# Patient Record
Sex: FEMALE
[De-identification: names, ages, dates, MRNs, and addresses within clinical notes are randomized per-mention and may not be internally consistent; named-entity substitution may affect disease eponyms.]

---

## 2021-03-01 ENCOUNTER — APPOINTMENT (OUTPATIENT)
Dept: ANTEPARTUM | Facility: CLINIC | Age: 36
End: 2021-03-01
Payer: SELF-PAY

## 2021-03-01 ENCOUNTER — ASOB RESULT (OUTPATIENT)
Age: 36
End: 2021-03-01

## 2021-03-01 PROBLEM — Z00.00 ENCOUNTER FOR PREVENTIVE HEALTH EXAMINATION: Status: ACTIVE | Noted: 2021-03-01

## 2021-03-01 PROCEDURE — 76811 OB US DETAILED SNGL FETUS: CPT

## 2021-07-09 ENCOUNTER — INPATIENT (INPATIENT)
Facility: HOSPITAL | Age: 36
LOS: 1 days | Discharge: HOME | End: 2021-07-11
Attending: OBSTETRICS & GYNECOLOGY | Admitting: OBSTETRICS & GYNECOLOGY

## 2021-07-09 ENCOUNTER — EMERGENCY (EMERGENCY)
Facility: HOSPITAL | Age: 36
LOS: 1 days | Discharge: STILL ADMITTED OR ADMINISTRATIVE | End: 2021-07-09
Admitting: EMERGENCY MEDICINE
Payer: SELF-PAY

## 2021-07-09 VITALS
RESPIRATION RATE: 18 BRPM | TEMPERATURE: 98 F | HEART RATE: 97 BPM | DIASTOLIC BLOOD PRESSURE: 53 MMHG | SYSTOLIC BLOOD PRESSURE: 114 MMHG | OXYGEN SATURATION: 100 %

## 2021-07-09 VITALS
DIASTOLIC BLOOD PRESSURE: 72 MMHG | TEMPERATURE: 98 F | SYSTOLIC BLOOD PRESSURE: 111 MMHG | HEART RATE: 91 BPM | RESPIRATION RATE: 16 BRPM

## 2021-07-09 DIAGNOSIS — O42.10 PREMATURE RUPTURE OF MEMBRANES, ONSET OF LABOR MORE THAN 24 HOURS FOLLOWING RUPTURE, UNSPECIFIED WEEKS OF GESTATION: ICD-10-CM

## 2021-07-09 DIAGNOSIS — Z98.890 OTHER SPECIFIED POSTPROCEDURAL STATES: Chronic | ICD-10-CM

## 2021-07-09 DIAGNOSIS — Z3A.00 WEEKS OF GESTATION OF PREGNANCY NOT SPECIFIED: ICD-10-CM

## 2021-07-09 DIAGNOSIS — Z33.2 ENCOUNTER FOR ELECTIVE TERMINATION OF PREGNANCY: Chronic | ICD-10-CM

## 2021-07-09 DIAGNOSIS — O26.899 OTHER SPECIFIED PREGNANCY RELATED CONDITIONS, UNSPECIFIED TRIMESTER: ICD-10-CM

## 2021-07-09 LAB
BASOPHILS # BLD AUTO: 0.02 K/UL — SIGNIFICANT CHANGE UP (ref 0–0.2)
BASOPHILS NFR BLD AUTO: 0.2 % — SIGNIFICANT CHANGE UP (ref 0–2)
BLD GP AB SCN SERPL QL: NEGATIVE — SIGNIFICANT CHANGE UP
COVID-19 SPIKE DOMAIN AB INTERP: NEGATIVE — SIGNIFICANT CHANGE UP
COVID-19 SPIKE DOMAIN ANTIBODY RESULT: 0.4 U/ML — SIGNIFICANT CHANGE UP
EOSINOPHIL # BLD AUTO: 0.16 K/UL — SIGNIFICANT CHANGE UP (ref 0–0.5)
EOSINOPHIL NFR BLD AUTO: 2 % — SIGNIFICANT CHANGE UP (ref 0–6)
HCT VFR BLD CALC: 33.3 % — LOW (ref 34.5–45)
HGB BLD-MCNC: 11 G/DL — LOW (ref 11.5–15.5)
IANC: 5.25 K/UL — SIGNIFICANT CHANGE UP (ref 1.5–8.5)
IMM GRANULOCYTES NFR BLD AUTO: 1.7 % — HIGH (ref 0–1.5)
LYMPHOCYTES # BLD AUTO: 1.73 K/UL — SIGNIFICANT CHANGE UP (ref 1–3.3)
LYMPHOCYTES # BLD AUTO: 21.5 % — SIGNIFICANT CHANGE UP (ref 13–44)
MCHC RBC-ENTMCNC: 28.7 PG — SIGNIFICANT CHANGE UP (ref 27–34)
MCHC RBC-ENTMCNC: 33 GM/DL — SIGNIFICANT CHANGE UP (ref 32–36)
MCV RBC AUTO: 86.9 FL — SIGNIFICANT CHANGE UP (ref 80–100)
MONOCYTES # BLD AUTO: 0.76 K/UL — SIGNIFICANT CHANGE UP (ref 0–0.9)
MONOCYTES NFR BLD AUTO: 9.4 % — SIGNIFICANT CHANGE UP (ref 2–14)
NEUTROPHILS # BLD AUTO: 5.25 K/UL — SIGNIFICANT CHANGE UP (ref 1.8–7.4)
NEUTROPHILS NFR BLD AUTO: 65.2 % — SIGNIFICANT CHANGE UP (ref 43–77)
NRBC # BLD: 0 /100 WBCS — SIGNIFICANT CHANGE UP
NRBC # FLD: 0 K/UL — SIGNIFICANT CHANGE UP
PLATELET # BLD AUTO: 294 K/UL — SIGNIFICANT CHANGE UP (ref 150–400)
RBC # BLD: 3.83 M/UL — SIGNIFICANT CHANGE UP (ref 3.8–5.2)
RBC # FLD: 14 % — SIGNIFICANT CHANGE UP (ref 10.3–14.5)
RH IG SCN BLD-IMP: POSITIVE — SIGNIFICANT CHANGE UP
RH IG SCN BLD-IMP: POSITIVE — SIGNIFICANT CHANGE UP
SARS-COV-2 IGG+IGM SERPL QL IA: 0.4 U/ML — SIGNIFICANT CHANGE UP
SARS-COV-2 IGG+IGM SERPL QL IA: NEGATIVE — SIGNIFICANT CHANGE UP
SARS-COV-2 RNA SPEC QL NAA+PROBE: SIGNIFICANT CHANGE UP
T PALLIDUM AB TITR SER: NEGATIVE — SIGNIFICANT CHANGE UP
WBC # BLD: 8.06 K/UL — SIGNIFICANT CHANGE UP (ref 3.8–10.5)
WBC # FLD AUTO: 8.06 K/UL — SIGNIFICANT CHANGE UP (ref 3.8–10.5)

## 2021-07-09 PROCEDURE — L9993: CPT

## 2021-07-09 RX ORDER — AER TRAVELER 0.5 G/1
1 SOLUTION RECTAL; TOPICAL EVERY 4 HOURS
Refills: 0 | Status: DISCONTINUED | OUTPATIENT
Start: 2021-07-09 | End: 2021-07-11

## 2021-07-09 RX ORDER — SODIUM CHLORIDE 9 MG/ML
3 INJECTION INTRAMUSCULAR; INTRAVENOUS; SUBCUTANEOUS EVERY 8 HOURS
Refills: 0 | Status: DISCONTINUED | OUTPATIENT
Start: 2021-07-09 | End: 2021-07-11

## 2021-07-09 RX ORDER — OXYTOCIN 10 UNIT/ML
2 VIAL (ML) INJECTION
Qty: 30 | Refills: 0 | Status: DISCONTINUED | OUTPATIENT
Start: 2021-07-09 | End: 2021-07-11

## 2021-07-09 RX ORDER — AMPICILLIN TRIHYDRATE 250 MG
1 CAPSULE ORAL EVERY 4 HOURS
Refills: 0 | Status: DISCONTINUED | OUTPATIENT
Start: 2021-07-09 | End: 2021-07-09

## 2021-07-09 RX ORDER — OXYTOCIN 10 UNIT/ML
333.33 VIAL (ML) INJECTION
Qty: 20 | Refills: 0 | Status: DISCONTINUED | OUTPATIENT
Start: 2021-07-09 | End: 2021-07-11

## 2021-07-09 RX ORDER — OXYCODONE HYDROCHLORIDE 5 MG/1
5 TABLET ORAL ONCE
Refills: 0 | Status: DISCONTINUED | OUTPATIENT
Start: 2021-07-09 | End: 2021-07-11

## 2021-07-09 RX ORDER — LANOLIN
1 OINTMENT (GRAM) TOPICAL EVERY 6 HOURS
Refills: 0 | Status: DISCONTINUED | OUTPATIENT
Start: 2021-07-09 | End: 2021-07-11

## 2021-07-09 RX ORDER — HYDROCORTISONE 1 %
1 OINTMENT (GRAM) TOPICAL EVERY 6 HOURS
Refills: 0 | Status: DISCONTINUED | OUTPATIENT
Start: 2021-07-09 | End: 2021-07-11

## 2021-07-09 RX ORDER — OXYCODONE HYDROCHLORIDE 5 MG/1
5 TABLET ORAL
Refills: 0 | Status: DISCONTINUED | OUTPATIENT
Start: 2021-07-09 | End: 2021-07-11

## 2021-07-09 RX ORDER — IBUPROFEN 200 MG
600 TABLET ORAL EVERY 6 HOURS
Refills: 0 | Status: COMPLETED | OUTPATIENT
Start: 2021-07-09 | End: 2022-06-07

## 2021-07-09 RX ORDER — DIBUCAINE 1 %
1 OINTMENT (GRAM) RECTAL EVERY 6 HOURS
Refills: 0 | Status: DISCONTINUED | OUTPATIENT
Start: 2021-07-09 | End: 2021-07-11

## 2021-07-09 RX ORDER — OXYTOCIN 10 UNIT/ML
333.33 VIAL (ML) INJECTION
Qty: 20 | Refills: 0 | Status: COMPLETED | OUTPATIENT
Start: 2021-07-09 | End: 2021-07-09

## 2021-07-09 RX ORDER — BENZOCAINE 10 %
1 GEL (GRAM) MUCOUS MEMBRANE EVERY 6 HOURS
Refills: 0 | Status: DISCONTINUED | OUTPATIENT
Start: 2021-07-09 | End: 2021-07-11

## 2021-07-09 RX ORDER — DIPHENHYDRAMINE HCL 50 MG
25 CAPSULE ORAL EVERY 6 HOURS
Refills: 0 | Status: DISCONTINUED | OUTPATIENT
Start: 2021-07-09 | End: 2021-07-11

## 2021-07-09 RX ORDER — DIPHENHYDRAMINE HCL 50 MG
25 CAPSULE ORAL EVERY 4 HOURS
Refills: 0 | Status: DISCONTINUED | OUTPATIENT
Start: 2021-07-09 | End: 2021-07-11

## 2021-07-09 RX ORDER — TETANUS TOXOID, REDUCED DIPHTHERIA TOXOID AND ACELLULAR PERTUSSIS VACCINE, ADSORBED 5; 2.5; 8; 8; 2.5 [IU]/.5ML; [IU]/.5ML; UG/.5ML; UG/.5ML; UG/.5ML
0.5 SUSPENSION INTRAMUSCULAR ONCE
Refills: 0 | Status: DISCONTINUED | OUTPATIENT
Start: 2021-07-09 | End: 2021-07-11

## 2021-07-09 RX ORDER — SIMETHICONE 80 MG/1
80 TABLET, CHEWABLE ORAL EVERY 4 HOURS
Refills: 0 | Status: DISCONTINUED | OUTPATIENT
Start: 2021-07-09 | End: 2021-07-11

## 2021-07-09 RX ORDER — MAGNESIUM HYDROXIDE 400 MG/1
30 TABLET, CHEWABLE ORAL
Refills: 0 | Status: DISCONTINUED | OUTPATIENT
Start: 2021-07-09 | End: 2021-07-11

## 2021-07-09 RX ORDER — KETOROLAC TROMETHAMINE 30 MG/ML
30 SYRINGE (ML) INJECTION ONCE
Refills: 0 | Status: DISCONTINUED | OUTPATIENT
Start: 2021-07-09 | End: 2021-07-09

## 2021-07-09 RX ORDER — CITRIC ACID/SODIUM CITRATE 300-500 MG
15 SOLUTION, ORAL ORAL EVERY 6 HOURS
Refills: 0 | Status: DISCONTINUED | OUTPATIENT
Start: 2021-07-09 | End: 2021-07-09

## 2021-07-09 RX ORDER — SODIUM CHLORIDE 9 MG/ML
1000 INJECTION, SOLUTION INTRAVENOUS
Refills: 0 | Status: DISCONTINUED | OUTPATIENT
Start: 2021-07-09 | End: 2021-07-09

## 2021-07-09 RX ORDER — PRAMOXINE HYDROCHLORIDE 150 MG/15G
1 AEROSOL, FOAM RECTAL EVERY 4 HOURS
Refills: 0 | Status: DISCONTINUED | OUTPATIENT
Start: 2021-07-09 | End: 2021-07-11

## 2021-07-09 RX ORDER — ACETAMINOPHEN 500 MG
975 TABLET ORAL
Refills: 0 | Status: DISCONTINUED | OUTPATIENT
Start: 2021-07-09 | End: 2021-07-11

## 2021-07-09 RX ORDER — AMPICILLIN TRIHYDRATE 250 MG
2 CAPSULE ORAL ONCE
Refills: 0 | Status: COMPLETED | OUTPATIENT
Start: 2021-07-09 | End: 2021-07-09

## 2021-07-09 RX ADMIN — Medication 25 MILLIGRAM(S): at 11:10

## 2021-07-09 RX ADMIN — Medication 108 GRAM(S): at 11:00

## 2021-07-09 RX ADMIN — Medication 216 GRAM(S): at 06:18

## 2021-07-09 RX ADMIN — Medication 108 GRAM(S): at 14:52

## 2021-07-09 RX ADMIN — Medication 0.2 MILLIGRAM(S): at 19:28

## 2021-07-09 RX ADMIN — Medication 30 MILLIGRAM(S): at 18:55

## 2021-07-09 RX ADMIN — Medication 1000 MILLIUNIT(S)/MIN: at 18:23

## 2021-07-09 RX ADMIN — SODIUM CHLORIDE 125 MILLILITER(S): 9 INJECTION, SOLUTION INTRAVENOUS at 07:02

## 2021-07-09 RX ADMIN — Medication 30 MILLIGRAM(S): at 18:56

## 2021-07-09 NOTE — OB PROVIDER DELIVERY SUMMARY - NSSELHIDDEN_OBGYN_ALL_OB_FT
[NS_DeliveryAttending1_OBGYN_ALL_OB_FT:[id number]],[NS_DeliveryAssist1_OBGYN_ALL_OB_FT:[id number]],[NS_DeliveryRN_OBGYN_ALL_OB_FT:[id number]],[NS_DeliveryAssist2_OBGYN_ALL_OB_FT:[id number]=]
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
## 2021-07-09 NOTE — OB RN DELIVERY SUMMARY - NSSELHIDDEN_OBGYN_ALL_OB_FT
[NS_DeliveryAttending1_OBGYN_ALL_OB_FT:[id number]],[NS_DeliveryAssist1_OBGYN_ALL_OB_FT:[id number]],[NS_DeliveryRN_OBGYN_ALL_OB_FT:[id number]]

## 2021-07-09 NOTE — OB PROVIDER H&P - PROBLEM SELECTOR PLAN 1
Admit for PROM  D/W Dr. Ca  Routine orders  Oral cytotec  Ampicillin for GBS  Epidural PRN for pain management  Covid swabbed and partner
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
## 2021-07-09 NOTE — OB PROVIDER TRIAGE NOTE - NS_OBGYNHISTORY_OBGYN_ALL_OB_FT
GYN: Denies  OB:   TOP x 6 (from prenatal record patient admitted to "3 or more"    AP course uncomplicated  GBS Positive

## 2021-07-09 NOTE — OB PROVIDER LABOR PROGRESS NOTE - ASSESSMENT
Plan:  37y/o  @38w6d in stable condition  - Con't IOL w/ pitocin  - Continuous EFM, [place]  - Con't IVF    D/W attending physician Dr. Annika Mccullough MD  PGY-1

## 2021-07-09 NOTE — ED ADULT TRIAGE NOTE - CHIEF COMPLAINT QUOTE
Patient , 39 weeks pregnant, she was sleeping and felt something wet.  She believes her "water broke".  Patient taken to L&D for eval.

## 2021-07-09 NOTE — OB PROVIDER LABOR PROGRESS NOTE - NS_SUBJECTIVE/OBJECTIVE_OBGYN_ALL_OB_FT
PGY1 Labor & Delivery Progress Note     Pt seen & examined at bedside for Category 2 FHT    T(C): 36.8 (07-09-21 @ 14:54), Max: 37 (07-09-21 @ 05:52)  HR: 96 (07-09-21 @ 15:30) (72 - 114)  BP: 118/57 (07-09-21 @ 15:21) (109/56 - 136/76)  RR: 18 (07-09-21 @ 05:52) (16 - 18)  SpO2: 100% (07-09-21 @ 15:30) (90% - 100%)
PGY1 Labor & Delivery Progress Note     Pt seen & examined at bedside s/p epidural placement.      T(C): 36.8 (07-09-21 @ 08:27), Max: 37 (07-09-21 @ 05:52)  HR: 93 (07-09-21 @ 08:54) (78 - 110)  BP: 112/58 (07-09-21 @ 08:06) (109/56 - 136/76)  RR: 18 (07-09-21 @ 05:52) (16 - 18)  SpO2: 100% (07-09-21 @ 08:54) (92% - 100%)

## 2021-07-09 NOTE — OB PROVIDER LABOR PROGRESS NOTE - ASSESSMENT
Plan:  37y/o  in stable condition. Tracing improved  - Con't IOL with pitocin  - Continuous EFM, [place]  - Con't IVF    D/W attending physician Dr. Raheel Gonzalez, PGY-1

## 2021-07-09 NOTE — OB PROVIDER DELIVERY SUMMARY - NSPROVIDERDELIVERYNOTE_OBGYN_ALL_OB_FT
Spontaneous vaginal delivery of liveborn male.  Head, shoulders and body delivered easily. Nuchal x1.  Cord was delayed 1 minute. Cord was cut.  Infant was passed to mother.  Placenta delivered spontaneously intact. Uterine massage was performed and pitocin was given.  Fundus was firm. Small second degree and vaginal wall lacerations x2 repaired with chromic.  Good hemostasis was noted.  Count correct x2.   Bladder was drained with a straight catheter, 350cc.
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
## 2021-07-09 NOTE — OB PROVIDER TRIAGE NOTE - NSHPPHYSICALEXAM_GEN_ALL_CORE
Vital Signs Last 24 Hrs  T(C): 36.9 (09 Jul 2021 03:32), Max: 36.9 (09 Jul 2021 03:29)  T(F): 98.42 (09 Jul 2021 03:32), Max: 98.42 (09 Jul 2021 03:32)  HR: 91 (09 Jul 2021 03:33) (91 - 97)  BP: 111/72 (09 Jul 2021 03:33) (111/72 - 114/53)  RR: 16 (09 Jul 2021 03:29) (16 - 18)  SpO2: 100% (09 Jul 2021 03:10) (100% - 100%)    Assessment reveals VSS  Abdomen soft, NT, gravid  Cat 1 tracing, ctx every 3 mins  Transabdominal Ultrasound- vtx  Sterile Speculum- positive pooling, positive nitrazine, positive ferning   Vaginal Exam- 1.5/90/-3  A&Ox3  Lungs- clear bilateral  Heart- normal rate and rhythm      PLAN:  Admit for PROM

## 2021-07-09 NOTE — OB PROVIDER TRIAGE NOTE - PSH
Termination of pregnancy (fetus)  pt states 3 or more, would rather not say  2 with D&C  1 with oral medication

## 2021-07-09 NOTE — OB RN DELIVERY SUMMARY - NS_SEPSISRSKCALC_OBGYN_ALL_OB_FT
EOS calculated successfully. EOS Risk Factor: 0.5/1000 live births ([facility] national incidence); GA=38w6d; Temp=98.6; ROM=15.4; GBS='Positive'; Antibiotics='GBS specific antibiotics > 2 hrs prior to birth'

## 2021-07-09 NOTE — CHART NOTE - NSCHARTNOTEFT_GEN_A_CORE
Attending Note     PT feeling occasional pressure     AFVSS  Gen in NAD   ABd soft, gravid, EFW 8#   SVE 6/100/-1  FHTs 124 mod patricia no decels + accels   TOCO q 2 min     A/P: nullip at term in active labor   continue meena Roblero MD

## 2021-07-10 RX ORDER — IBUPROFEN 200 MG
600 TABLET ORAL EVERY 6 HOURS
Refills: 0 | Status: DISCONTINUED | OUTPATIENT
Start: 2021-07-10 | End: 2021-07-11

## 2021-07-10 RX ADMIN — Medication 600 MILLIGRAM(S): at 17:21

## 2021-07-10 RX ADMIN — Medication 600 MILLIGRAM(S): at 09:02

## 2021-07-10 RX ADMIN — Medication 975 MILLIGRAM(S): at 14:01

## 2021-07-10 RX ADMIN — Medication 975 MILLIGRAM(S): at 14:23

## 2021-07-10 RX ADMIN — Medication 600 MILLIGRAM(S): at 09:55

## 2021-07-10 RX ADMIN — Medication 1 TABLET(S): at 09:02

## 2021-07-10 RX ADMIN — SODIUM CHLORIDE 3 MILLILITER(S): 9 INJECTION INTRAMUSCULAR; INTRAVENOUS; SUBCUTANEOUS at 06:30

## 2021-07-10 NOTE — LACTATION INITIAL EVALUATION - INTERVENTION OUTCOME
nbn just 24  hours  and  mother  states nbn  has  latched  and  nursed  1 or two times .  discussed  sleepy  first day and that nbn  will feed  more often  and  importance of   8or  more  feedings  and  feed on  cue. and  encouraged  to ask  for assistance  as needed.  nurse  aware of  visit   and  triple  feeding plan  if  nbn  not  making  progress./verbalizes understanding

## 2021-07-10 NOTE — LACTATION INITIAL EVALUATION - LACTATION INTERVENTIONS
initiate/review safe skin-to-skin/initiate/review hand expression/initiate/review techniques for position and latch/initiate/review supplementation plan due to medical indications/review techniques to increase milk supply/review techniques to manage sore nipples/engorgement/initiate/review breast massage/compression/reviewed components of an effective feeding and at least 8 effective feedings per day required/reviewed importance of monitoring infant diapers, the breastfeeding log, and minimum output each day/reviewed risks of unnecessary formula supplementation/reviewed benefits and recommendations for rooming in/reviewed feeding on demand/by cue at least 8 times a day/recommended follow-up with pediatrician within 24 hours of discharge/reviewed indications of inadequate milk transfer that would require supplementation

## 2021-07-10 NOTE — PROGRESS NOTE ADULT - SUBJECTIVE AND OBJECTIVE BOX
OB Progress Note:  PPD#1    S: 37yo PPD#1 s/p  c/b prolonged ROM (15h 24min) s/p ampicillin. Patient feels well, denies fever/chill overnight. Pain is well controlled. She is tolerating a regular diet. She is voiding spontaneously, and ambulating without difficulty. Denies CP/SOB. Denies lightheadedness/dizziness. Denies N/V.    O:  Vitals:  Vital Signs Last 24 Hrs  T(C): 36.9 (10 Jul 2021 05:26), Max: 37.1 (10 Jul 2021 02:31)  T(F): 98.5 (10 Jul 2021 05:26), Max: 98.8 (10 Jul 2021 02:31)  HR: 88 (10 Jul 2021 05:) (72 - 127)  BP: 128/64 (10 Jul 2021 05:26) (110/83 - 152/85)  BP(mean): --  RR: 19 (10 Jul 2021 05:26) (17 - 19)  SpO2: 98% (10 Jul 2021 05:26) (90% - 100%)    MEDICATIONS  (STANDING):  acetaminophen   Tablet .. 975 milliGRAM(s) Oral <User Schedule>  diphtheria/tetanus/pertussis (acellular) Vaccine (ADAcel) 0.5 milliLiter(s) IntraMuscular once  ibuprofen  Tablet. 600 milliGRAM(s) Oral every 6 hours  oxytocin Infusion 333.333 milliUNIT(s)/Min (1000 mL/Hr) IV Continuous <Continuous>  oxytocin Infusion. 2 milliUNIT(s)/Min (2 mL/Hr) IV Continuous <Continuous>  prenatal multivitamin 1 Tablet(s) Oral daily  sodium chloride 0.9% lock flush 3 milliLiter(s) IV Push every 8 hours      Labs:  Blood type: A Positive  Rubella IgG: RPR: Negative                          11.0<L>   8.06 >-----------< 294    (  @ 05:08 )             33.3<L>                  Physical Exam:  General: NAD  Abdomen: soft, non-tender, non-distended, fundus firm  Extremities: No erythema/edema    A/P: 37yo PPD#1 s/p  c/b prolonged ROM (15h 24min) s/p ampicillin. Patient is stable and doing well post-partum.   - Pain well controlled, continue current pain regimen  - Increase ambulation, SCDs when not ambulating  - Continue regular diet  - CTM VS      Ina Jones, PGY-1

## 2021-07-11 ENCOUNTER — TRANSCRIPTION ENCOUNTER (OUTPATIENT)
Age: 36
End: 2021-07-11

## 2021-07-11 VITALS
HEART RATE: 74 BPM | RESPIRATION RATE: 20 BRPM | TEMPERATURE: 98 F | DIASTOLIC BLOOD PRESSURE: 68 MMHG | OXYGEN SATURATION: 100 % | SYSTOLIC BLOOD PRESSURE: 112 MMHG

## 2021-07-11 RX ORDER — ACETAMINOPHEN 500 MG
3 TABLET ORAL
Qty: 0 | Refills: 0 | DISCHARGE
Start: 2021-07-11

## 2021-07-11 RX ORDER — IBUPROFEN 200 MG
1 TABLET ORAL
Qty: 0 | Refills: 0 | DISCHARGE
Start: 2021-07-11

## 2021-07-11 RX ADMIN — Medication 600 MILLIGRAM(S): at 12:08

## 2021-07-11 RX ADMIN — Medication 600 MILLIGRAM(S): at 00:02

## 2021-07-11 RX ADMIN — Medication 600 MILLIGRAM(S): at 05:06

## 2021-07-11 RX ADMIN — Medication 600 MILLIGRAM(S): at 06:00

## 2021-07-11 RX ADMIN — Medication 600 MILLIGRAM(S): at 13:00

## 2021-07-11 RX ADMIN — Medication 600 MILLIGRAM(S): at 00:40

## 2021-07-11 NOTE — DISCHARGE NOTE OB - CARE PROVIDERS DIRECT ADDRESSES
[email or web address]

## 2021-07-11 NOTE — DISCHARGE NOTE OB - PATIENT PORTAL LINK FT
You can access the FollowMyHealth Patient Portal offered by [facility] by registering at the following website: [email or web address]. By joining [employer]’s FollowMyHealth portal, you will also be able to view your health information using other applications (apps) compatible with our system.

## 2021-07-11 NOTE — DISCHARGE NOTE OB - ADDITIONAL INSTRUCTIONS
After discharge, please stay on pelvic rest for 6 weeks, meaning no sexual intercourse, no tampons and no douching.  Women can lose a lot of blood during delivery and there is a possibility of being lightheaded/fainting.  No lifting objects heavier than baby for two weeks.  Expect to have vaginal bleeding/spotting for up to six weeks.  The bleeding should get lighter and more white/light brown with time.  For bleeding soaking more than a pad an hour or passing clots greater than the size of your fist, come in to the emergency department.    Follow up Dr. Devries in 6 weeks.

## 2021-07-11 NOTE — DISCHARGE NOTE OB - MATERIALS PROVIDED
Vaccinations/[facility]  Screening Program/  Immunization Record/Breastfeeding Log/Guide to Postpartum Care/[facility] Hearing Screen Program/Back To Sleep Handout/Shaken Baby Prevention Handout/Birth Certificate Instructions/Tdap Vaccination (VIS Pub Date: 2012)

## 2021-07-11 NOTE — PROGRESS NOTE ADULT - ASSESSMENT
A/P: 37yo PPD#2 s/p .  Patient is stable and doing well post-partum.    - Pain well controlled, continue current pain regimen  - Increase ambulation, SCDs when not ambulating  - Continue regular diet  - Discharge planning     Mago Gonzalez, PGY1

## 2021-07-11 NOTE — DISCHARGE NOTE OB - MEDICATION SUMMARY - MEDICATIONS TO TAKE
I will START or STAY ON the medications listed below when I get home from the hospital:    acetaminophen 325 mg oral tablet  -- 3 tab(s) by mouth   -- Indication: For vaginal delivery    ibuprofen 600 mg oral tablet  -- 1 tab(s) by mouth every 6 hours  -- Indication: For vaginal delivery    PNV Prenatal oral tablet  -- 1 tab(s) by mouth once a day  -- Indication: For vaginal delivery

## 2021-07-11 NOTE — DISCHARGE NOTE OB - CARE PROVIDER_API CALL
Ana Laura Devries  OBSTETRICS AND GYNECOLOGY  [address] [place] [address]  Phone: [phone]  Fax: [phone]  Follow Up Time:

## 2021-07-11 NOTE — PROGRESS NOTE ADULT - SUBJECTIVE AND OBJECTIVE BOX
OB Progress Note:  PPD#2    S: 35yo PPD#2 s/p . Patient feels well. Pain is well controlled. She is tolerating a regular diet and passing flatus. She is voiding spontaneously, and ambulating without difficulty. Denies CP/SOB. Denies lightheadedness/dizziness. Denies N/V.    O:  Vitals:   Vital Signs Last 24 Hrs  T(C): 36.9 (2021 04:16), Max: 36.9 (2021 04:16)  T(F): 98.4 (2021 04:16), Max: 98.4 (2021 04:16)  HR: 84 (2021 04:16) (77 - 84)  BP: 124/66 (2021 04:16) (124/66 - 124/78)  BP(mean): --  RR: 18 (2021 04:16) (18 - 18)  SpO2: 100% (2021 04:16) (99% - 100%)    MEDICATIONS  (STANDING):  acetaminophen   Tablet .. 975 milliGRAM(s) Oral <User Schedule>  diphtheria/tetanus/pertussis (acellular) Vaccine (ADAcel) 0.5 milliLiter(s) IntraMuscular once  ibuprofen  Tablet. 600 milliGRAM(s) Oral every 6 hours  oxytocin Infusion 333.333 milliUNIT(s)/Min (1000 mL/Hr) IV Continuous <Continuous>  oxytocin Infusion. 2 milliUNIT(s)/Min (2 mL/Hr) IV Continuous <Continuous>  prenatal multivitamin 1 Tablet(s) Oral daily  sodium chloride 0.9% lock flush 3 milliLiter(s) IV Push every 8 hours    MEDICATIONS  (PRN):  benzocaine 20%/menthol 0.5% Spray 1 Spray(s) Topical every 6 hours PRN for Perineal discomfort  dibucaine 1% Ointment 1 Application(s) Topical every 6 hours PRN Perineal discomfort  diphenhydrAMINE 25 milliGRAM(s) Oral every 6 hours PRN Pruritus  diphenhydrAMINE 25 milliGRAM(s) Oral every 4 hours PRN Rash and/or Itching  hydrocortisone 1% Cream 1 Application(s) Topical every 6 hours PRN Moderate Pain (4-6)  lanolin Ointment 1 Application(s) Topical every 6 hours PRN nipple soreness  magnesium hydroxide Suspension 30 milliLiter(s) Oral two times a day PRN Constipation  oxyCODONE    IR 5 milliGRAM(s) Oral every 3 hours PRN Moderate to Severe Pain (4-10)  oxyCODONE    IR 5 milliGRAM(s) Oral once PRN Moderate to Severe Pain (4-10)  pramoxine 1%/zinc 5% Cream 1 Application(s) Topical every 4 hours PRN Moderate Pain (4-6)  simethicone 80 milliGRAM(s) Chew every 4 hours PRN Gas  witch hazel Pads 1 Application(s) Topical every 4 hours PRN Perineal discomfort      Labs:  Blood type: A Positive  Rubella IgG: RPR: Negative                          11.0<L>   8.06 >-----------< 294    (  @ 05:08 )             33.3<L>                  Physical Exam:  General: NAD  Abdomen: soft, non-tender, non-distended, fundus firm  Vaginal: Lochia wnl  Extremities: No erythema/edema

## 2021-07-11 NOTE — DISCHARGE NOTE OB - HOSPITAL COURSE
Patient was admitted for PROM IOL at 38w6d. Patient had an uncomplicated labor course and delivered a liveborn male infant. She had an uncomplicated postpartum course and was cleared for discharge on PPD#2. On day of discharge she is feeling well, voiding spontaneously, and tolerating a regular diet. She has appropriate lochia.               11.0   8.06  )-----------( 294      ( 07-09 @ 05:08 )             33.3

## 2021-07-11 NOTE — PROGRESS NOTE ADULT - ATTENDING COMMENTS
Attending Note   PPD 1 s/p    doing well   minimal bleeding   pain adequately controlled   voiding freely   abd soft, fundus firm   perineum healing well   routine pp care  SW consult for anxiety/depression in therapy     ALONSO Roblero MD
Attending Note     PPD 2 s/p    doing well   no complaints  pain adequately controlled  voiding freely  minimal bleeding   AFVSS  Gen in NAD   ABd soft, fundus firm nontender  Ext: no c/c/e    A/P: PPD 2 s/p    routine postpartum care  circumcision requested by mother   d/c home today  precautions reviewed     ALONSO Roblero MD

## 2021-07-11 NOTE — DISCHARGE NOTE OB - CARE PLAN
Principal Discharge DX:	Full-term premature rupture of membranes with onset of labor within 24 hours of rupture  Goal:	recovery after delivery  Assessment and plan of treatment:	normal postpartum course and recovery

## 2021-07-19 ENCOUNTER — TRANSCRIPTION ENCOUNTER (OUTPATIENT)
Age: 36
End: 2021-07-19

## 2021-12-13 ENCOUNTER — TRANSCRIPTION ENCOUNTER (OUTPATIENT)
Age: 36
End: 2021-12-13